# Patient Record
Sex: FEMALE
[De-identification: names, ages, dates, MRNs, and addresses within clinical notes are randomized per-mention and may not be internally consistent; named-entity substitution may affect disease eponyms.]

---

## 2018-02-16 ENCOUNTER — HOSPITAL ENCOUNTER (OUTPATIENT)
Dept: HOSPITAL 31 - C.SDS | Age: 43
Discharge: HOME | End: 2018-02-16
Attending: OBSTETRICS & GYNECOLOGY
Payer: COMMERCIAL

## 2018-02-16 VITALS
HEART RATE: 90 BPM | SYSTOLIC BLOOD PRESSURE: 100 MMHG | OXYGEN SATURATION: 99 % | TEMPERATURE: 97 F | DIASTOLIC BLOOD PRESSURE: 70 MMHG

## 2018-02-16 VITALS — BODY MASS INDEX: 23.3 KG/M2

## 2018-02-16 VITALS — RESPIRATION RATE: 18 BRPM

## 2018-02-16 DIAGNOSIS — K66.0: ICD-10-CM

## 2018-02-16 DIAGNOSIS — N83.202: Primary | ICD-10-CM

## 2018-02-16 DIAGNOSIS — R10.2: ICD-10-CM

## 2018-02-16 PROCEDURE — 88305 TISSUE EXAM BY PATHOLOGIST: CPT

## 2018-02-16 PROCEDURE — 58661 LAPAROSCOPY REMOVE ADNEXA: CPT

## 2018-02-16 PROCEDURE — 86900 BLOOD TYPING SEROLOGIC ABO: CPT

## 2018-02-16 PROCEDURE — 36415 COLL VENOUS BLD VENIPUNCTURE: CPT

## 2018-02-16 PROCEDURE — 86850 RBC ANTIBODY SCREEN: CPT

## 2018-02-16 RX ADMIN — HYDROMORPHONE HYDROCHLORIDE PRN MG: 1 INJECTION, SOLUTION INTRAMUSCULAR; INTRAVENOUS; SUBCUTANEOUS at 12:55

## 2018-02-16 RX ADMIN — HYDROMORPHONE HYDROCHLORIDE PRN MG: 1 INJECTION, SOLUTION INTRAMUSCULAR; INTRAVENOUS; SUBCUTANEOUS at 13:12

## 2018-02-17 NOTE — OP
PROCEDURE DATE:  02/16/2018



SURGEON:  Dr. Mohini Xiong



ASSISTANT:  Cristofer Thurston MD



TYPE OF ANESTHESIA:  General endotracheal.



PREOPERATIVE DIAGNOSIS:   Pelvic pain, left ovarian cyst.



POSTOPERATIVE DIAGNOSIS:   Pelvic pain, left ovarian cyst.



OPERATIVE FINDINGS:  10-week sized anteverted uterus, normal-appearing

tubes and ovaries bilaterally, enlarged left  ovarian tube with lysis of

adhesions and omental adhesions to anterior abdominal wall, to uterus, and

to left adnexa.



Dr. Cristofer Thurston was surgical assistant who was present for the entire case,

essentially in gaining entry laparoscopically, establishing

pneumoperitoneum, removing cyst , was unable to achieve adequate

hemostasis, with left tube and ovary removed, omental washings obtained. 

Omental adhesions removed prior to adnexa surgery due to poor visualization

using LigaSure device.  Pelvic washings obtained, 45 minutes spent in lysis

of adhesions while remainder of time spent on ovarian cyst, normal

appearing tubes and ovaries.



OPERATIONS PERFORMED:  Operative laparoscopy, pelvic washings, left

salpingo-oophorectomy, lysis of adhesions



SPECIMEN REMOVED:  Left fallopian tubes and ovaries, pelvic washings.



ESTIMATED BLOOD LOSS:  50 mL.



BLOOD PRODUCTS:  None.



DESCRIPTION OF PROCEDURE:  The patient was taken to the operating where she

was given general anesthesia.  Once found to be adequate, she was placed on

the operating table in the dorsal supine position with legs supported using

stirrups.  The patient was then prepped and draped in the usual sterile

fashion.  A time-out confirmed correct patient and correct procedure.

Bimanual exam was performed with the above mentioned findings.  A Downs

catheter was then inserted into the urethra to drain the bladder. 

Following this, a Dennison retractor was placed in the anterior and posterior

fornix of the vagina, and the cervix was adequately visualized.  A

single-tooth tenaculum was placed on the anterior lip of the cervix.  The

uterus was then sounded to 8 cm.  Following this, cervix was sequentially

dilated.  A HUMI uterine manipulator was then inserted and insufflated with

8 mL of air.  Following this, a single-tooth tenaculum was removed.  There

was good hemostasis at the tenaculum puncture site.  Following this, the

surgeon then re-gloved, and attention was then turned to the abdomen in

which 0.25 Marcaine was administered infraumbilically in a 5 mm skin

incision.  The skin was then tented  up with 2 towel clamps, and after the

skin incision was obtained, the Veress needle was then inserted. 

Confirmation was then placed with a normal saline test.  Following this,

the abdomen was then insufflated with CO2 with pressure of 15 mmHg, and

following this, the Veress needle was then removed, and a 5-mm laparoscope

was then inserted under direct visualization.  There was good confirmation

and placement within the peritoneal cavity, and the above-mentioned

findings as noted.  Following this, an additional 5-mm port was then

inserted in the left lower quadrant after giving local under direct

visualization, and a 12 mm port in the right lower quadrant.   Following

this, the adhesions of the omentum to the anterior abdomen was carefully

removed using the 5 mm LigaSure device.  The peritoneal washings were

obtained prior to doing this.  After this, the ovary was then attempted to

be carefully dissected using careful blunt dissection with a peanut. 

However, there was bleeding noted, hemostasis unable to be obtained.  There

was dense adhesions of the ovary noted to the tube and also omental

adhesions noted which were carefully removed.  The ovary was then removed

with the left fallopian tube which was adherent, and sent to pathology on

Telfa using LigaSure device.  There was good hemostasis noted. The abdomen

was then irrigated and there was good hemostasis noted.  The specimen was

then removed using an EndoCatch bag through the 12 mm port.  The cyst

appeared abnormal and enlarged.  Following this, the abdomen was irrigated,

and there was good hemostasis noted at all operative sites including lysis

of adhesions.   The abdomen was then desufflated and the port removed under

direct visualization.  The 12 mm fascial and skin incision was closed using

two S-retractors, and the fascia was reapproximated and closed with UR6

needle.  The skin incisions were all closed with 4-0 Monocryl  in a running

subcuticular fashion.  The HUMI manipulator and Downs were then removed. 

At the end of the procedure, all needles, sponge, and instrument counts

were noted to be correct x2.  The patient tolerated the procedure well and

was transferred to the recovery room in stable condition.





__________________________________________

Mohini Xiong MD



DD:  02/16/2018 18:09:42

DT:  02/17/2018 0:20:38

Job # [id number]